# Patient Record
Sex: MALE | Race: BLACK OR AFRICAN AMERICAN | Employment: FULL TIME | URBAN - METROPOLITAN AREA
[De-identification: names, ages, dates, MRNs, and addresses within clinical notes are randomized per-mention and may not be internally consistent; named-entity substitution may affect disease eponyms.]

---

## 2018-01-22 ENCOUNTER — HOSPITAL ENCOUNTER (EMERGENCY)
Age: 33
Discharge: HOME | End: 2018-01-22
Attending: EMERGENCY MEDICINE
Payer: SELF-PAY

## 2018-01-22 VITALS
SYSTOLIC BLOOD PRESSURE: 129 MMHG | BODY MASS INDEX: 27.28 KG/M2 | HEIGHT: 68 IN | WEIGHT: 180 LBS | TEMPERATURE: 98.9 F | HEART RATE: 86 BPM | RESPIRATION RATE: 14 BRPM | DIASTOLIC BLOOD PRESSURE: 90 MMHG | OXYGEN SATURATION: 100 %

## 2018-01-22 DIAGNOSIS — M54.5 ACUTE BILATERAL LOW BACK PAIN, WITH SCIATICA PRESENCE UNSPECIFIED: Primary | ICD-10-CM

## 2018-01-22 PROCEDURE — 74011250637 HC RX REV CODE- 250/637: Performed by: EMERGENCY MEDICINE

## 2018-01-22 PROCEDURE — 99283 EMERGENCY DEPT VISIT LOW MDM: CPT

## 2018-01-22 RX ORDER — TRAMADOL HYDROCHLORIDE 50 MG/1
50 TABLET ORAL
Status: DISCONTINUED | OUTPATIENT
Start: 2018-01-22 | End: 2018-01-22 | Stop reason: HOSPADM

## 2018-01-22 RX ORDER — TRAMADOL HYDROCHLORIDE 50 MG/1
50 TABLET ORAL
Qty: 12 TAB | Refills: 0 | Status: SHIPPED | OUTPATIENT
Start: 2018-01-22 | End: 2018-01-25

## 2018-01-22 RX ORDER — CYCLOBENZAPRINE HCL 10 MG
5 TABLET ORAL
Qty: 12 TAB | Refills: 0 | Status: SHIPPED | OUTPATIENT
Start: 2018-01-22 | End: 2020-02-14

## 2018-01-22 RX ORDER — CYCLOBENZAPRINE HCL 10 MG
10 TABLET ORAL
Status: COMPLETED | OUTPATIENT
Start: 2018-01-22 | End: 2018-01-22

## 2018-01-22 RX ORDER — IBUPROFEN 600 MG/1
600 TABLET ORAL
Status: COMPLETED | OUTPATIENT
Start: 2018-01-22 | End: 2018-01-22

## 2018-01-22 RX ORDER — IBUPROFEN 600 MG/1
600 TABLET ORAL
Qty: 1 TAB | Refills: 0 | Status: SHIPPED | OUTPATIENT
Start: 2018-01-22 | End: 2018-01-22

## 2018-01-22 RX ORDER — IBUPROFEN 600 MG/1
600 TABLET ORAL
Qty: 20 TAB | Refills: 0 | OUTPATIENT
Start: 2018-01-22 | End: 2020-02-14

## 2018-01-22 RX ADMIN — IBUPROFEN 600 MG: 600 TABLET, FILM COATED ORAL at 14:07

## 2018-01-22 RX ADMIN — CYCLOBENZAPRINE HYDROCHLORIDE 10 MG: 10 TABLET, FILM COATED ORAL at 14:07

## 2018-01-22 RX ADMIN — TRAMADOL HYDROCHLORIDE 50 MG: 50 TABLET, FILM COATED ORAL at 14:07

## 2018-01-22 NOTE — ED PROVIDER NOTES
EMERGENCY DEPARTMENT HISTORY AND PHYSICAL EXAM    1:54 PM      Date: 1/22/2018  Patient Name: Armando Parker    History of Presenting Illness     Chief Complaint   Patient presents with    Back Pain         History Provided By: Patient    Chief Complaint: back pain  Duration:  Hours  Timing:  Worsening  Location: lower back  Quality: Aching  Severity: Severe  Modifying Factors: movement  Associated Symptoms: denies any other associated signs or symptoms      Additional History (Context): Armando Parker is a 27 y.o. male with No significant past medical history who presents with lower back pain that become worse at 0900 am. The pt reports his back pain has been bothering him since last week but it was not that bad, but today when he was lifting a box at work caused him to have severe muscle spasm in his back. The pt says the pain does not radiate. The pt has not taken any OTC medications for pain. The pt denies weakness, numbness, and incontinence. The pt had no other complaints or concerns in the ED. PCP: None    Current Outpatient Prescriptions   Medication Sig Dispense Refill    cyclobenzaprine (FLEXERIL) 10 mg tablet Take 0.5 Tabs by mouth three (3) times daily (with meals). 12 Tab 0    traMADol (ULTRAM) 50 mg tablet Take 1 Tab by mouth every six (6) hours as needed for up to 3 days. Max Daily Amount: 200 mg. 12 Tab 0    ibuprofen (MOTRIN) 600 mg tablet Take 1 Tab by mouth every six (6) hours as needed for Pain. 20 Tab 0    ondansetron hcl (ZOFRAN, AS HYDROCHLORIDE,) 4 mg tablet Take 2 Tabs by mouth every eight (8) hours as needed for Nausea. 12 Tab 0       Past History     Past Medical History:  No past medical history on file. Past Surgical History:  No past surgical history on file. Family History:  No family history on file.     Social History:  Social History   Substance Use Topics    Smoking status: Never Smoker    Smokeless tobacco: Not on file    Alcohol use Yes      Comment: occasional Allergies:  No Known Allergies      Review of Systems       Review of Systems   Constitutional: Negative for chills and fever. Respiratory: Negative for shortness of breath. Cardiovascular: Negative for chest pain. Gastrointestinal: Negative for diarrhea, nausea and vomiting. Musculoskeletal: Positive for back pain. All other systems reviewed and are negative. Physical Exam     Visit Vitals    /90 (BP 1 Location: Right arm, BP Patient Position: At rest;Sitting)    Pulse 86    Temp 98.9 °F (37.2 °C)    Resp 14    Ht 5' 8\" (1.727 m)    Wt 81.6 kg (180 lb)    SpO2 100%    BMI 27.37 kg/m2         Physical Exam   Constitutional: He appears well-developed and well-nourished. HENT:   Head: Normocephalic and atraumatic. Eyes: Conjunctivae are normal. Pupils are equal, round, and reactive to light. Neck: Normal range of motion. Neck supple. Cardiovascular: Normal rate and regular rhythm. Pulmonary/Chest: Effort normal and breath sounds normal.   Abdominal: Soft. Bowel sounds are normal.   Musculoskeletal: Normal range of motion. Lumbar back: He exhibits tenderness. Lumbar perispinal tenderness. Lymphadenopathy:     He has no cervical adenopathy. Neurological: He is alert. Skin: Skin is warm. Psychiatric: He has a normal mood and affect. Nursing note and vitals reviewed. Diagnostic Study Results     Labs -  No results found for this or any previous visit (from the past 12 hour(s)). Radiologic Studies -   No orders to display         Medical Decision Making   I am the first provider for this patient. I reviewed the vital signs, available nursing notes, past medical history, past surgical history, family history and social history. Vital Signs-Reviewed the patient's vital signs. Records Reviewed: Nursing Notes and Old Medical Records (Time of Review: 1:54 PM)    Provider Notes (Medical Decision Making):  Back pain. No acute trauma or injury. No radiation of pain. No evidence shows need for xray. Will treat with symptomatic treatment. Diagnosis     Clinical Impression:   1. Acute bilateral low back pain, with sciatica presence unspecified        Disposition: Discharged Home. Follow-up Information     Follow up With Details Comments [facility] MD Olivia Call in 1 day Follow Up From Emergency Department [address]  [phone]             Discharge Medication List as of 1/22/2018  1:59 PM      CONTINUE these medications which have NOT CHANGED    Details   ondansetron hcl (ZOFRAN, AS HYDROCHLORIDE,) 4 mg tablet Take 2 Tabs by mouth every eight (8) hours as needed for Nausea. , Print, Disp-12 Tab, R-0           _______________________________    Attestations:  Ke Morales acting as a scribe for and in the presence of Jalil Kennedy MD      January 22, 2018 at 1:54 PM       Provider Attestation:      I personally performed the services described in the documentation, reviewed the documentation, as recorded by the scribe in my presence, and it accurately and completely records my words and actions.  January 22, 2018 at 1:54 PM - Jalil Kennedy MD    _______________________________

## 2018-01-22 NOTE — DISCHARGE INSTRUCTIONS

## 2018-01-22 NOTE — ED TRIAGE NOTES
Patient states his back has been bothering him for the past week, today worsened, denies recent injury/trauma, patient states he was lifting boxes today

## 2018-01-22 NOTE — LETTER
NOTIFICATION RETURN TO WORK / SCHOOL 
 
1/22/2018 2:04 PM 
 
Mr. Herber Jane [address]  [address] [facility] [phone] To Whom It May Concern: 
 
Mariana Caro is currently under the care of [address] [place] [address] EMERGENCY DEPT. He will return to work/school on: 01/24/2018 If there are questions or concerns please have the patient contact our office. Sincerely, Siva Webb MD

## 2020-02-14 ENCOUNTER — HOSPITAL ENCOUNTER (EMERGENCY)
Age: 35
Discharge: HOME | End: 2020-02-14
Attending: EMERGENCY MEDICINE
Payer: COMMERCIAL

## 2020-02-14 VITALS
OXYGEN SATURATION: 100 % | RESPIRATION RATE: 18 BRPM | TEMPERATURE: 98.1 F | SYSTOLIC BLOOD PRESSURE: 137 MMHG | WEIGHT: 175 LBS | BODY MASS INDEX: 26.52 KG/M2 | HEART RATE: 74 BPM | HEIGHT: 68 IN | DIASTOLIC BLOOD PRESSURE: 83 MMHG

## 2020-02-14 DIAGNOSIS — K08.89 PAIN, DENTAL: Primary | ICD-10-CM

## 2020-02-14 PROCEDURE — 99282 EMERGENCY DEPT VISIT SF MDM: CPT

## 2020-02-14 RX ORDER — PENICILLIN V POTASSIUM 500 MG/1
500 TABLET, FILM COATED ORAL 4 TIMES DAILY
Qty: 28 TAB | Refills: 0 | Status: SHIPPED | OUTPATIENT
Start: 2020-02-14 | End: 2020-02-21

## 2020-02-14 RX ORDER — IBUPROFEN 600 MG/1
600 TABLET ORAL
Qty: 20 TAB | Refills: 0 | Status: SHIPPED | OUTPATIENT
Start: 2020-02-14

## 2020-02-15 NOTE — ED PROVIDER NOTES
[facility] EMERGENCY DEPT      Date: 2/14/2020  Patient Name: Vidhya Adamson    History of Presenting Illness     Chief Complaint   Patient presents with    Dental Pain       37 y.o. male otherwise healthy presents the ED complaining of left lower toothache intermittent over the past month, worse over the past few days. Patient notes taking Tylenol today without much improvement of his pain. States that about a month ago he noted the tooth made a crunching noise and felt the pain initially after that. He denies any fever, chills, difficulty breathing or swallowing, drooling, other symptoms at this time. Patient denies any other associated signs or symptoms. Patient denies any other complaints. Nursing notes regarding the HPI and triage nursing notes were reviewed. Prior medical records were reviewed. Current Outpatient Medications   Medication Sig Dispense Refill    penicillin v potassium (VEETID) 500 mg tablet Take 1 Tab by mouth four (4) times daily for 7 days. 28 Tab 0    ibuprofen (MOTRIN) 600 mg tablet Take 1 Tab by mouth every six (6) hours as needed for Pain. 20 Tab 0       Past History     Past Medical History:  None     Past Surgical History:  History reviewed. No pertinent surgical history. Family History:  History reviewed. No pertinent family history. Social History:  Social History     Tobacco Use    Smoking status: Never Smoker   Substance Use Topics    Alcohol use: Yes     Comment: occasional    Drug use: No       Allergies:  No Known Allergies    Patient's primary care provider (as noted in EPIC):  None    Review of Systems   Constitutional:  Denies malaise, fever, chills. ENMT:  + left lower dental pain. Neck:  Denies injury or pain. Neuro:  Denies headache, LOC, dizziness, neurologic symptoms/deficits/paresthesias. Skin: Denies injury, rash, itching or skin changes. All other systems negative as reviewed.      Visit Vitals  /83 (BP 1 Location: Right arm, BP Patient Position: At rest)   Pulse 74   Temp 98.1 °F (36.7 °C)   Resp 18   Ht 5' 8\" (1.727 m)   Wt 79.4 kg (175 lb)   SpO2 100%   BMI 26.61 kg/m²       PHYSICAL EXAM:    CONSTITUTIONAL:  Alert, in no apparent distress;  well developed;  well nourished. HEAD:  Normocephalic, atraumatic. EYES:  EOMI. Non-icteric sclera. Normal conjunctiva. ENTM:  Nose:  no rhinorrhea. Throat:  no erythema or exudate, mucous membranes moist.  Uvula midline, airway patent. NECK:  Supple  RESPIRATORY:  Chest clear, equal breath sounds, good air movement. CARDIOVASCULAR:  Regular rate and rhythm. No murmurs, rubs, or gallops. NEURO:  Moves all four extremities, and grossly normal motor exam.  SKIN:  No rashes;  Normal for age. PSYCH:  Alert and normal affect. Oropharynx/ teeth:  Tooth # 19 has focal tenderness to percussion. There is minimal surrounding gingival erythema and edema, there is no fluctuance or abscess. Oropharynx is otherwise normal and symmetric. IMPRESSION AND MEDICAL DECISION MAKING:  Based upon the patients presentation with noted HPI and PE, along with the work up done in the emergency department, I believe that the patient is having a toothache. Will write for penicillin, patient to take ibuprofen 600 mg every 6-8 hours for pain. Informed him he may take Tylenol with this as well if he needs. Patient to follow-up with the dental clinic, return for any acute worsening. Diagnosis:   1.  Pain, dental      Disposition: Discharge    Follow-up Information     Follow up With Specialties Details Why Contact Info    [address]  In 3 days  [facility] [address]  [phone]    Tin  In 3 days  [address] [facility] [address]    [facility] EMERGENCY DEPT Emergency Medicine  If symptoms worsen [address]  [phone]          Patient's Medications   Start Taking    IBUPROFEN (MOTRIN) 600 MG TABLET    Take 1 Tab by mouth every six (6) hours as needed for Pain. PENICILLIN V POTASSIUM (VEETID) 500 MG TABLET    Take 1 Tab by mouth four (4) times daily for 7 days. Continue Taking    No medications on file   These Medications have changed    No medications on file   Stop Taking    CYCLOBENZAPRINE (FLEXERIL) 10 MG TABLET    Take 0.5 Tabs by mouth three (3) times daily (with meals). IBUPROFEN (MOTRIN) 600 MG TABLET    Take 1 Tab by mouth every six (6) hours as needed for Pain. ONDANSETRON HCL (ZOFRAN, AS HYDROCHLORIDE,) 4 MG TABLET    Take 2 Tabs by mouth every eight (8) hours as needed for Nausea.      ANDREW Hubbard

## 2020-02-15 NOTE — ED TRIAGE NOTES
Pt to ED c/o lower left side dental pain. States he bit down on something and heard a crack with severe pain 1 month ago, now c/o worsened pain.  No N/V

## 2020-02-15 NOTE — DISCHARGE INSTRUCTIONS
Patient Education        Tooth and Gum Pain: Care Instructions  Your Care Instructions    The most common causes of dental pain are tooth decay and gum disease. Pain can also be caused by an infection of the tooth (abscess) or the gums. Or you may have pain from a broken or cracked tooth. Other causes of pain include infection and damage to a tooth from nervous grinding of your teeth. A wisdom tooth can be painful when it is coming in but cannot break through the gum. It can also be painful when the tooth is only partway in and extra gum tissue has formed around it. The tissue can get inflamed (pericoronitis), and sometimes it gets infected. Prompt dental care can help find the cause of your toothache and keep the tooth from dying or gum disease from getting worse. Self-care at home may reduce your pain and discomfort. Follow-up care is a key part of your treatment and safety. Be sure to make and go to all appointments, and call your dentist or doctor if you are having problems. It's also a good idea to know your test results and keep a list of the medicines you take. How can you care for yourself at home? · To reduce pain and facial swelling, put an ice or cold pack on the outside of your cheek for 10 to 20 minutes at a time. Put a thin cloth between the ice and your skin. Do not use heat. · If your doctor prescribed antibiotics, take them as directed. Do not stop taking them just because you feel better. You need to take the full course of antibiotics. · Ask your doctor if you can take an over-the-counter pain medicine, such as acetaminophen (Tylenol), ibuprofen (Advil, Motrin), or naproxen (Aleve). Be safe with medicines. Read and follow all instructions on the label. · Avoid very hot, cold, or sweet foods and drinks if they increase your pain. · Rinse your mouth with warm salt water every 2 hours to help relieve pain and swelling. Mix 1 teaspoon of salt in 8 ounces of water.   · Talk to your dentist about using special toothpaste for sensitive teeth. To reduce pain on contact with heat or cold or when brushing, brush with this toothpaste regularly or rub a small amount of the paste on the sensitive area with a clean finger 2 or 3 times a day. Floss gently between your teeth. · Do not smoke or use spit tobacco. Tobacco use can make gum problems worse, decreases your ability to fight infection in your gums, and delays healing. If you need help quitting, talk to your doctor about stop-smoking programs and medicines. These can increase your chances of quitting for good. When should you call for help? Call 911 anytime you think you may need emergency care. For example, call if:    · You have trouble breathing.    Call your dentist or doctor now or seek immediate medical care if:    · You have signs of infection, such as:  ? Increased pain, swelling, warmth, or redness. ? Red streaks leading from the area. ? Pus draining from the area. ? A fever.    Watch closely for changes in your health, and be sure to contact your doctor if:    · You do not get better as expected. Where can you learn more? Go to http://sumi-florence.info/. Enter [id number] [id number] in the search box to learn more about \"Tooth and Gum Pain: Care Instructions. \"  Current as of: October 3, 2018  Content Version: 12.2  © 1243-1544 [employer]. Care instructions adapted under license by [employer] (which disclaims liability or warranty for this information). If you have questions about a medical condition or this instruction, always ask your healthcare professional. [place] [address] any warranty or liability for your use of this information.

## 2023-03-13 ENCOUNTER — OFFICE VISIT (OUTPATIENT)
Age: 38
End: 2023-03-13
Payer: COMMERCIAL

## 2023-03-13 ENCOUNTER — HOSPITAL ENCOUNTER (OUTPATIENT)
Facility: HOSPITAL | Age: 38
Setting detail: SPECIMEN
Discharge: HOME | End: 2023-03-16
Payer: COMMERCIAL

## 2023-03-13 ENCOUNTER — TELEPHONE (OUTPATIENT)
Age: 38
End: 2023-03-13

## 2023-03-13 VITALS
BODY MASS INDEX: 26.49 KG/M2 | RESPIRATION RATE: 17 BRPM | HEIGHT: 68 IN | OXYGEN SATURATION: 98 % | HEART RATE: 70 BPM | DIASTOLIC BLOOD PRESSURE: 68 MMHG | TEMPERATURE: 98.3 F | WEIGHT: 174.8 LBS | SYSTOLIC BLOOD PRESSURE: 120 MMHG

## 2023-03-13 DIAGNOSIS — B00.9 HERPES SIMPLEX VIRUS (HSV) INFECTION: ICD-10-CM

## 2023-03-13 DIAGNOSIS — R30.0 DYSURIA: ICD-10-CM

## 2023-03-13 DIAGNOSIS — Z20.2 STD EXPOSURE: ICD-10-CM

## 2023-03-13 DIAGNOSIS — Z20.2 STD EXPOSURE: Primary | ICD-10-CM

## 2023-03-13 LAB
ALBUMIN SERPL-MCNC: 4 G/DL (ref 3.4–5)
ALBUMIN/GLOB SERPL: 1 (ref 0.8–1.7)
ALP SERPL-CCNC: 59 U/L (ref 45–117)
ALT SERPL-CCNC: 24 U/L (ref 16–61)
ANION GAP SERPL CALC-SCNC: 2 MMOL/L (ref 3–18)
AST SERPL-CCNC: 18 U/L (ref 10–38)
BASOPHILS # BLD: 0.1 K/UL (ref 0–0.1)
BASOPHILS NFR BLD: 1 % (ref 0–2)
BILIRUB SERPL-MCNC: 0.2 MG/DL (ref 0.2–1)
BILIRUBIN, URINE, POC: NEGATIVE
BLOOD URINE, POC: ABNORMAL
BUN SERPL-MCNC: 23 MG/DL (ref 7–18)
BUN/CREAT SERPL: 22 (ref 12–20)
CALCIUM SERPL-MCNC: 9.4 MG/DL (ref 8.5–10.1)
CHLORIDE SERPL-SCNC: 108 MMOL/L (ref 100–111)
CO2 SERPL-SCNC: 28 MMOL/L (ref 21–32)
CREAT SERPL-MCNC: 1.06 MG/DL (ref 0.6–1.3)
DIFFERENTIAL METHOD BLD: ABNORMAL
EOSINOPHIL # BLD: 0.3 K/UL (ref 0–0.4)
EOSINOPHIL NFR BLD: 4 % (ref 0–5)
ERYTHROCYTE [DISTWIDTH] IN BLOOD BY AUTOMATED COUNT: 14.7 % (ref 11.6–14.5)
GLOBULIN SER CALC-MCNC: 4.1 G/DL (ref 2–4)
GLUCOSE SERPL-MCNC: 95 MG/DL (ref 74–99)
GLUCOSE URINE, POC: NEGATIVE
HCT VFR BLD AUTO: 41.3 % (ref 36–48)
HGB BLD-MCNC: 12.4 G/DL (ref 13–16)
IMM GRANULOCYTES # BLD AUTO: 0 K/UL (ref 0–0.04)
IMM GRANULOCYTES NFR BLD AUTO: 0 % (ref 0–0.5)
KETONES, URINE, POC: NEGATIVE
LEUKOCYTE ESTERASE, URINE, POC: NEGATIVE
LYMPHOCYTES # BLD: 1.4 K/UL (ref 0.9–3.6)
LYMPHOCYTES NFR BLD: 18 % (ref 21–52)
MCH RBC QN AUTO: 22.2 PG (ref 24–34)
MCHC RBC AUTO-ENTMCNC: 30 G/DL (ref 31–37)
MCV RBC AUTO: 74 FL (ref 78–100)
MONOCYTES # BLD: 0.5 K/UL (ref 0.05–1.2)
MONOCYTES NFR BLD: 7 % (ref 3–10)
NEUTS SEG # BLD: 5.2 K/UL (ref 1.8–8)
NEUTS SEG NFR BLD: 70 % (ref 40–73)
NITRITE, URINE, POC: NEGATIVE
NRBC # BLD: 0 K/UL (ref 0–0.01)
NRBC BLD-RTO: 0 PER 100 WBC
PH, URINE, POC: 5.5 (ref 4.6–8)
PLATELET # BLD AUTO: 314 K/UL (ref 135–420)
PMV BLD AUTO: 10.3 FL (ref 9.2–11.8)
POTASSIUM SERPL-SCNC: 4.6 MMOL/L (ref 3.5–5.5)
PROT SERPL-MCNC: 8.1 G/DL (ref 6.4–8.2)
PROTEIN,URINE, POC: ABNORMAL
RBC # BLD AUTO: 5.58 M/UL (ref 4.35–5.65)
SODIUM SERPL-SCNC: 138 MMOL/L (ref 136–145)
SPECIFIC GRAVITY, URINE, POC: 1.03 (ref 1–1.03)
URINALYSIS CLARITY, POC: CLEAR
URINALYSIS COLOR, POC: YELLOW
UROBILINOGEN, POC: ABNORMAL
WBC # BLD AUTO: 7.4 K/UL (ref 4.6–13.2)

## 2023-03-13 PROCEDURE — 85025 COMPLETE CBC W/AUTO DIFF WBC: CPT

## 2023-03-13 PROCEDURE — 87350 HEPATITIS BE AG IA: CPT

## 2023-03-13 PROCEDURE — 86696 HERPES SIMPLEX TYPE 2 TEST: CPT

## 2023-03-13 PROCEDURE — 80053 COMPREHEN METABOLIC PANEL: CPT

## 2023-03-13 PROCEDURE — 86780 TREPONEMA PALLIDUM: CPT

## 2023-03-13 PROCEDURE — 86803 HEPATITIS C AB TEST: CPT

## 2023-03-13 PROCEDURE — 81003 URINALYSIS AUTO W/O SCOPE: CPT | Performed by: NURSE PRACTITIONER

## 2023-03-13 PROCEDURE — 86593 SYPHILIS TEST NON-TREP QUANT: CPT

## 2023-03-13 PROCEDURE — 86592 SYPHILIS TEST NON-TREP QUAL: CPT

## 2023-03-13 PROCEDURE — 87389 HIV-1 AG W/HIV-1&-2 AB AG IA: CPT

## 2023-03-13 PROCEDURE — 99203 OFFICE O/P NEW LOW 30 MIN: CPT | Performed by: NURSE PRACTITIONER

## 2023-03-13 PROCEDURE — 36415 COLL VENOUS BLD VENIPUNCTURE: CPT

## 2023-03-13 RX ORDER — VALACYCLOVIR HYDROCHLORIDE 1 G/1
1000 TABLET, FILM COATED ORAL 2 TIMES DAILY
Qty: 20 TABLET | Refills: 0 | Status: SHIPPED | OUTPATIENT
Start: 2023-03-13 | End: 2023-03-20

## 2023-03-13 SDOH — ECONOMIC STABILITY: FOOD INSECURITY: WITHIN THE PAST 12 MONTHS, YOU WORRIED THAT YOUR FOOD WOULD RUN OUT BEFORE YOU GOT MONEY TO BUY MORE.: NEVER TRUE

## 2023-03-13 SDOH — ECONOMIC STABILITY: HOUSING INSECURITY
IN THE LAST 12 MONTHS, WAS THERE A TIME WHEN YOU DID NOT HAVE A STEADY PLACE TO SLEEP OR SLEPT IN A SHELTER (INCLUDING NOW)?: NO

## 2023-03-13 SDOH — ECONOMIC STABILITY: FOOD INSECURITY: WITHIN THE PAST 12 MONTHS, THE FOOD YOU BOUGHT JUST DIDN'T LAST AND YOU DIDN'T HAVE MONEY TO GET MORE.: NEVER TRUE

## 2023-03-13 SDOH — ECONOMIC STABILITY: INCOME INSECURITY: HOW HARD IS IT FOR YOU TO PAY FOR THE VERY BASICS LIKE FOOD, HOUSING, MEDICAL CARE, AND HEATING?: NOT HARD AT ALL

## 2023-03-13 ASSESSMENT — PATIENT HEALTH QUESTIONNAIRE - PHQ9
SUM OF ALL RESPONSES TO PHQ QUESTIONS 1-9: 0
SUM OF ALL RESPONSES TO PHQ QUESTIONS 1-9: 0
1. LITTLE INTEREST OR PLEASURE IN DOING THINGS: 0
SUM OF ALL RESPONSES TO PHQ QUESTIONS 1-9: 0
2. FEELING DOWN, DEPRESSED OR HOPELESS: 0
SUM OF ALL RESPONSES TO PHQ9 QUESTIONS 1 & 2: 0
SUM OF ALL RESPONSES TO PHQ QUESTIONS 1-9: 0

## 2023-03-13 NOTE — TELEPHONE ENCOUNTER
Patient stopped at checkout to verify if meds just discussed during visit has been sent to the pharmacy, advised nothing showing just yet, patient requesting for meds to be sent to [address] [place] [address], [address] [place] [address]

## 2023-03-13 NOTE — PROGRESS NOTES
Alcira Clayton (:  1985) is a 42 y.o. male,New patient, here for evaluation of the following chief complaint(s):  Skin Problem         ASSESSMENT/PLAN:  1. STD exposure  -     Hepatitis Be antigen; Future  -     HIV 1/2 Ag/Ab, 4TH Generation,W Rflx Confirm; Future  -     T. pallidum Ab; Future  -     Chlamydia/Neisseria/Trichomonas DNA; Future  -     CBC with Auto Differential; Future  -     Comprehensive Metabolic Panel; Future  -     Hepatitis C Antibody; Future  -     HSV 1 and 2 Specific Ab, IgG; Future  -     AMB POC URINALYSIS DIP STICK AUTO W/ MICRO- trace blood otherwise wnl  2. Dysuria  -     AMB POC URINALYSIS DIP STICK AUTO W/ MICRO  3. Herpes simplex virus (HSV) infection  -     valACYclovir (VALTREX) 1 g tablet; Take 1 tablet by mouth 2 times daily for 7 days, Disp-20 tablet, R-0Normal    Return in about 3 months (around 2023), or if symptoms worsen or fail to improve, for annual physical exam.         Subjective   SUBJECTIVE/OBJECTIVE:  Skin Problem    Pt has sores and blisters on penis. Been going on for 2 wks now. Sx of itching, stinging sensation, and then the blisters and sores develop. blisters with clear fluid, not current today. Reports noted sores on the head of the penis. Denies discharge, burning sensation. Denies flu like symptoms, weight has been stable. He prefers males. Review of Systems   Genitourinary:  Positive for dysuria and genital sores. Negative for hematuria, penile discharge, penile pain, penile swelling and testicular pain. Skin:  Positive for rash. Objective   Physical Exam  Constitutional:       General: He is not in acute distress. Appearance: Normal appearance. HENT:      Head: Normocephalic and atraumatic. Pulmonary:      Effort: Pulmonary effort is normal.   Neurological:      General: No focal deficit present. Mental Status: He is alert and oriented to person, place, and time.    Psychiatric:         Mood and Affect: Mood normal.         Behavior: Behavior normal.         Thought Content: Thought content normal.         Judgment: Judgment normal.          No results found for: WBC, HGB, HCT, MCV, PLT  No results found for: NA, K, CL, CO2, BUN, CREATININE, GLUCOSE, CALCIUM, PROT, LABALBU, BILITOT, ALKPHOS, AST, ALT, LABGLOM, GFRAA, AGRATIO, GLOB            An electronic signature was used to authenticate this note.     --Angeli Zavala, JAMMIE - NP

## 2023-03-13 NOTE — PROGRESS NOTES
1. \"Have you been to the ER, urgent care clinic since your last visit? Hospitalized since your last visit? \" No    2. \"Have you seen or consulted any other health care providers outside of the [address] [place] [address] since your last visit? \" No     3. For patients aged 39-70: Has the patient had a colonoscopy / FIT/ Cologuard? NA - based on age    Pt has sores and blisters on penis. Been going on for 2 wks now. Sx was itching and a stinging sensation. Then the blisters and sores develop. Stated it did have clear like fluid but now its not there.

## 2023-03-14 LAB
HBV E AG SERPL QL IA: NEGATIVE
HCV AB SER IA-ACNC: 0.08 INDEX
HCV AB SERPL QL IA: NEGATIVE
Lab: NORMAL

## 2023-03-15 LAB
HIV 1+2 AB+HIV1 P24 AG SERPL QL IA: NONREACTIVE
HIV 1/2 RESULT COMMENT: NORMAL
HSV1 IGG SER IA-ACNC: 2.24 INDEX (ref 0–0.9)
HSV2 IGG SER IA-ACNC: <0.91 INDEX (ref 0–0.9)
RPR SER QL: ABNORMAL
RPR SER-TITR: NORMAL {TITER}
T PALLIDUM AB SER QL IA: ABNORMAL

## 2023-03-20 ENCOUNTER — TELEPHONE (OUTPATIENT)
Age: 38
End: 2023-03-20

## 2023-03-20 NOTE — TELEPHONE ENCOUNTER
Patient aware syphillis positive, he reports was treated today 3/20/2023 at the [facility] and has scheduled a 6 month follow up with them to test for cure. He was asked to inform partner(s) so that they may be treated. He verbalized understanding.

## 2023-03-20 NOTE — PROGRESS NOTES
The patient was contacted by the [address] [place] [address] and received treatment for syphillis today, he reports was told and scheduled for follow up at 6 months. I advised him to inform his partner(s) so that they receive treatment. He verbalized understanding.

## 2023-08-25 ENCOUNTER — OFFICE VISIT (OUTPATIENT)
Age: 38
End: 2023-08-25
Payer: COMMERCIAL

## 2023-08-25 VITALS
OXYGEN SATURATION: 95 % | HEART RATE: 73 BPM | HEIGHT: 68 IN | SYSTOLIC BLOOD PRESSURE: 105 MMHG | WEIGHT: 183.4 LBS | TEMPERATURE: 97.9 F | BODY MASS INDEX: 27.8 KG/M2 | DIASTOLIC BLOOD PRESSURE: 63 MMHG

## 2023-08-25 DIAGNOSIS — L30.9 DERMATITIS: Primary | ICD-10-CM

## 2023-08-25 PROCEDURE — 99213 OFFICE O/P EST LOW 20 MIN: CPT | Performed by: NURSE PRACTITIONER

## 2023-08-25 ASSESSMENT — PATIENT HEALTH QUESTIONNAIRE - PHQ9
SUM OF ALL RESPONSES TO PHQ QUESTIONS 1-9: 0
1. LITTLE INTEREST OR PLEASURE IN DOING THINGS: 0
SUM OF ALL RESPONSES TO PHQ QUESTIONS 1-9: 0
SUM OF ALL RESPONSES TO PHQ QUESTIONS 1-9: 0
2. FEELING DOWN, DEPRESSED OR HOPELESS: 0
SUM OF ALL RESPONSES TO PHQ QUESTIONS 1-9: 0
SUM OF ALL RESPONSES TO PHQ9 QUESTIONS 1 & 2: 0

## 2023-08-25 NOTE — PROGRESS NOTES
Syphilis flare up. On bilateral arms, top of L foot, eye sore. Pt mentioned pain and itchiness in the rectum area but has went away.  Been going on since 8/22

## 2023-08-25 NOTE — PROGRESS NOTES
1. \"Have you been to the ER, urgent care clinic since your last visit? Hospitalized since your last visit? \" No    2. \"Have you seen or consulted any other health care providers outside of the [address] [place] [address] since your last visit? \" No     3. For patients aged 43-73: Has the patient had a colonoscopy / FIT/ Cologuard?  NA - based on age